# Patient Record
(demographics unavailable — no encounter records)

---

## 2024-10-11 NOTE — HISTORY OF PRESENT ILLNESS
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 She is unable to finish her tasks on time and has to put extra effort into her work way beyond what her friends will do. She is here to be evaluated for ADHD. Patient often has trouble wrapping up details of a project, getting things in order, remembering appointments/obligations, keeping attention during boring/repetitive work, concentrating on what people say to her, and finding things at home/work. Patient often avoids starting tasks, fidgets/squirms, feels overly compelled to do things, makes careless mistakes, and gets distracted by noise/activity around them. Patient often leaves her seat in situations she should be seated, has difficulty unwinding during down time, and finds herself talking too much in social situations. Patient has to read and reread written material to maintain comprehension.  Diagnostic Testing : MRI Brain 10.16.23 : Normal MRI Brain EEg 11.15.23 : Normal EZRA 11.22.23 : Score below 0, results consistent with the findings in patient with ADHD  Today : Today I had the pleasure of seeing Ms. Figueroa in  follow up via tele-health using the solo bibi.  The patients previous history and physical findings have been reviewed.  Patient remains under our care for ADHD, a chronic condition for which she is receiving active treatment for. 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 Today patient reports significant improvement in her ability to focus and concentrate while limiting distractions on her new regimen and denies adverse side effects. She is happy with her current management and wishes to continue as is. Today she will complete updated urine drug screening as part of our offices controlled substance agreement.

## 2024-10-11 NOTE — ASSESSMENT
[FreeTextEntry1] : 30 year old female with adult ADHD. She will continue her regimen of Adderall ER 10mg once daily and an additional Adderall ER 5mg once daily PRN. She  follow up in 1 month via TTM and today will complete updated UDS testing.  Patient is aware that they may call/ contact the office at any time if they have any additional questions or concerns regarding their management. All potential risks, benefits, side effects and interactions of any medications prescribed have been discussed in detail with the patient.   Supervising Physician : Honorio Catalan MD

## 2024-10-11 NOTE — PHYSICAL EXAM
[FreeTextEntry1] : PHYSICAL EXAMINATION: Head: Normocephalic, atraumatic. Negative TA tenderness/prominence.   Neck: Supple with full range of motion; nontender with negative bilateral Spurling's signs.   Spine: Full range of motion; nontender. Negative straight leg raise maneuvers.   Extremities: Non-tender. Atraumatic. Negative Tinel's signs.   NEUROLOGICAL EXAMINATION:   Mental Status: Patient is a good informant with intact orientation, attention, concentration, recent and remote memory. Language evaluation reveals no evidence of aphasia. Fund of knowledge is normal.   Cranial Nerves Cranial Nerves:   II, III, IV, VI: Pupils are equal, round, and reactive to light and accommodation. No evidence of afferent pupillary defect. Visual fields are full to confrontation. Eye movements are full without evidence of nystagmus or internuclear ophthalmoplegia. Funduscopic examination reveals sharp disc margins.   V: Normal jaw movements. Normal facial sensation.   VII: Normal facial motor testing.   VIII: Grossly normal hearing bilaterally.   IX, X: Palate moves symmetrically. No dysarthria.   XI: Normal shoulder shrug and sternocleidomastoid power.   XII: Tongue appears normal and protrudes in the midline.   Motor: Normal bulk, tone, and power throughout.   Muscle Stretch Reflexes (right/left): 2+ symmetrical.   Plantar Responses: Flexor bilaterally.   Coordination: Normal finger to nose and heel to shin testing, no truncal ataxia and no tremor.   Sensation: Normal primary sensation. Normal double simultaneous stimulation.   Gait and Station: Normal base, stride, and turning. Normal toe and heel walking. Normal tandem. Negative Romberg. [General Appearance - Alert] : alert [General Appearance - In No Acute Distress] : in no acute distress [Oriented To Time, Place, And Person] : oriented to person, place, and time [Impaired Insight] : insight and judgment were intact [Affect] : the affect was normal [Person] : oriented to person [Place] : oriented to place [Time] : oriented to time [Concentration Intact] : normal concentrating ability [Visual Intact] : visual attention was ~T not ~L decreased [Naming Objects] : no difficulty naming common objects [Repeating Phrases] : no difficulty repeating a phrase [Writing A Sentence] : no difficulty writing a sentence [Fluency] : fluency intact [Comprehension] : comprehension intact [Reading] : reading intact [Past History] : adequate knowledge of personal past history [Cranial Nerves Optic (II)] : visual acuity intact bilaterally,  visual fields full to confrontation, pupils equal round and reactive to light [Cranial Nerves Oculomotor (III)] : extraocular motion intact [Cranial Nerves Trigeminal (V)] : facial sensation intact symmetrically [Cranial Nerves Facial (VII)] : face symmetrical [Cranial Nerves Vestibulocochlear (VIII)] : hearing was intact bilaterally [Cranial Nerves Glossopharyngeal (IX)] : tongue and palate midline [Cranial Nerves Accessory (XI - Cranial And Spinal)] : head turning and shoulder shrug symmetric [Cranial Nerves Hypoglossal (XII)] : there was no tongue deviation with protrusion [Motor Strength] : muscle strength was normal in all four extremities [No Muscle Atrophy] : normal bulk in all four extremities [Motor Strength Upper Extremities Bilaterally] : strength was normal in both upper extremities [Motor Strength Lower Extremities Bilaterally] : strength was normal in both lower extremities [Sensation Tactile Decrease] : light touch was intact [Balance] : balance was intact [Past-pointing] : there was no past-pointing [Tremor] : no tremor present [2+] : Ankle jerk left 2+ [Plantar Reflex Right Only] : normal on the right [Plantar Reflex Left Only] : normal on the left [FreeTextEntry6] : - JINA B/l  [Extraocular Movements] : extraocular movements were intact [PERRL With Normal Accommodation] : pupils were equal in size, round, reactive to light, with normal accommodation [No Spinal Tenderness] : no spinal tenderness [Abnormal Walk] : normal gait [Involuntary Movements] : no involuntary movements were seen [Motor Tone] : muscle strength and tone were normal

## 2024-10-16 NOTE — HISTORY OF PRESENT ILLNESS
[Y] : Positive pregnancy history [Normal Amount/Duration] :  normal amount and duration [Regular Cycle Intervals] : periods have been regular [No] : Patient does not have concerns regarding sex [Currently Active] : currently active [LMPDate] : 10/02/24 [MensesFreq] : 28 [MensesLength] : 3 [MensesAmount] : light [PGxTotal] : 1 [facility] [FreeTextEntry1] : 10/01/24

## 2024-10-16 NOTE — PHYSICAL EXAM
[Chaperone Present] : A chaperone was present in the examining room during all aspects of the physical examination [[id number]] : A chaperone was present during the pelvic exam. [Appropriately responsive] : appropriately responsive [Alert] : alert [No Acute Distress] : no acute distress [Soft] : soft [Non-tender] : non-tender [Non-distended] : non-distended [Oriented x3] : oriented x3 [Examination Of The Breasts] : a normal appearance [No Discharge] : no discharge [No Masses] : no breast masses were palpable [Labia Majora] : normal [Labia Minora] : normal [Normal] : normal [Uterine Adnexae] : normal [FreeTextEntry2] : Qian

## 2024-10-16 NOTE — HISTORY OF PRESENT ILLNESS
[Y] : Positive pregnancy history [Normal Amount/Duration] :  normal amount and duration [Regular Cycle Intervals] : periods have been regular [No] : Patient does not have concerns regarding sex [Currently Active] : currently active [LMPDate] : 10/02/24 [MensesFreq] : 28 [MensesLength] : 3 [MensesAmount] : light [PGxTotal] : 1 [facility] [FreeTextEntry1] : 10/01/24

## 2024-10-16 NOTE — PHYSICAL EXAM
[Chaperone Present] : A chaperone was present in the examining room during all aspects of the physical examination [[id number]] : A chaperone was present during the pelvic exam. [Appropriately responsive] : appropriately responsive [Alert] : alert [No Acute Distress] : no acute distress [Soft] : soft [Non-tender] : non-tender [Non-distended] : non-distended [Oriented x3] : oriented x3 [Examination Of The Breasts] : a normal appearance [No Discharge] : no discharge [No Masses] : no breast masses were palpable [Labia Majora] : normal [Labia Minora] : normal [Normal] : normal [Uterine Adnexae] : normal [FreeTextEntry2] : Qian

## 2024-10-16 NOTE — PROCEDURE
[Cervical Pap Smear] : cervical Pap smear [Liquid Base] : liquid base [GC & Chlamydia via Pap] : GC & Chlamydia via Pap [Tolerated Well] : the patient tolerated the procedure well [No Complications] : there were no complications [Transvaginal Ultrasound] : transvaginal ultrasound [L: ___ cm] : L: [unfilled] cm [W: ___cm] : W: [unfilled] cm [H: ___ cm] : H: [unfilled] cm [FreeTextEntry9] : Irregular menses [FreeTextEntry5] : 52.74cc vol 8.48mm [FreeTextEntry7] : 13.97cc vol [FreeTextEntry8] : 7.14cc vol

## 2025-01-28 NOTE — IMAGING
[de-identified] : Pleasant young woman walks into my office in no distress.  She walks in with no antalgia or limp.  Physical examination: Pleated with female medical assistant present Left hip thigh and knee: Mild tenderness palpation of the middle portion of her iliotibial band.  No focal tenderness over the trochanteric bursa or over inguinal crease.  No lymph nodes inguinal crease.  No tenderness along the lateral joint line of her knee.  No significant tenderness over the lateral epicondyle.  Mildly abnormal provocative testing iliotibial band.  Flexion abduction external rotation elicits mild discomfort over the lateral aspects of her thigh.  She does not have pain with forced internal or external rotation of joints at full extension 9 degrees of flexion and neutral abduction.  Radiographs: Left hip (AP pelvis, lateral): No bony abnormalities.  No evidence of fracture.  Joint spaces well-preserved.  Bellybutton ring present on plain radiograph.
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
## 2025-01-28 NOTE — ASSESSMENT
[FreeTextEntry1] : Young woman contused iliotibial band 5 years ago with some residual pain over the lateral aspect of her thigh.  Suspect she probably had some injury to the iliotibial band.  There is not a specific surgical management for this problem.  Instead recommend physical therapy which should focus on teaching a self-directed stretching and strengthening and judicious use of nonsteroidal anti-inflammatory medication.  Findings and recommendations were reviewed with the patient.  Questions answered to her satisfaction.  Prescription for Naprosyn provided.  Prescription for PT provided.  Follow-up 3 months.  All questions answered to her satisfaction.  Cc copy note to her primary medical doctor.

## 2025-02-24 NOTE — REASON FOR VISIT
Maryann Follow-Up: _____] : a [unfilled] follow-up visit

## 2025-02-24 NOTE — HISTORY OF PRESENT ILLNESS
[FreeTextEntry1] : Today : Today I had the pleasure of seeing Ms. FIGUEROA in our office for follow up.  Their past medical history and imaging have been reviewed.   Ms. Figueroa is a pleasant 30 year old female who remains under our care for ADHD, a chronic condition for which she is receiving active treatment for. 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 Today patient reports significant improvement in her ability to focus and concentrate while limiting distractions on her new regimen and denies adverse side effects. She is happy with her current management and wishes to continue as is. Today she will complete updated UDs testing.

## 2025-02-24 NOTE — ASSESSMENT
[FreeTextEntry1] : 30 year old female with ADHD stable on her regimen of Adderall Er 10mg once daily and ER 5mg once daily PRN. UDs completed today.  Patient will RTO for f/u in office in 1 month. [place]  checked no inconsistencies.   Supervising Physician : López Benedict DO

## 2025-03-10 NOTE — REVIEW OF SYSTEMS
[Arthralgia] : arthralgia [Joint Pain] : joint pain [Joint Stiffness] : joint stiffness [Negative] : Constitutional [de-identified] : L lower extremity

## 2025-03-10 NOTE — HISTORY OF PRESENT ILLNESS
[de-identified] : Ms. Figueroa is a 30 year old female who presents to the office accompanied by her  for evaluation of lower back pain that returned about a week ago.  She had been previously seen for this lower back issue however was doing extremely well.  She is unsure exactly what happened stating that the pain came out of nowhere and has been excruciating.  She states is predominantly localized to the left side of her lower back and radiates down the leg.  Will sometimes transfer to the right side depending upon what she is doing.  She can hardly sit or stand and states that walking is almost impossible.  She has been using a cane but states it is not helping.  She also been using anti-inflammatory medication without any improvement and did set up for physical therapy session however it is not until next week.

## 2025-03-10 NOTE — IMAGING
[de-identified] : Lumbar spine: 4 out of 5 strength, 2+ reflexes, limited range of motion due to pain, positive tenderness bilateral lumbar paraspinal muscles, positive straight leg raise on the left at 20 degrees.

## 2025-03-10 NOTE — ASSESSMENT
[FreeTextEntry1] : 30-year-old female with lumbar radiculopathy most likely secondary to herniated or protruding disc.  I have prescribed her with a Medrol Dosepak as well as methocarbamol 750 mg 1-2 before bed.  I have also requested authorization for an MRI of her lumbar spine for further evaluation and she will start physical therapy hopefully next week.  She will follow-up in 4 weeks for reassessment is aware if there is any issues she can contact the office and they verbalized understanding and agreement.

## 2025-03-19 NOTE — DISCUSSION/SUMMARY
[de-identified] : 30-year-old female presenting with left lower extremity lumbar radiculopathy.  I am recommending an MRI of her lumbar spine.  We are prescription for meloxicam.  Follow-up with pain management I will see her back in a few months after her appointment with pain management after her MRI of the lumbar spine is complete.

## 2025-03-19 NOTE — IMAGING
[de-identified] : TTP midline spine and paraspinal musculature  Strength                                          Hip flexor   Right: 5/5; Left: 5/5                              Knee extensor     Right: 5/5; Left: 5/5                      Ankle dorsiflexion   Right: 5/5; Left: 5/5                   EHL           Right: 5/5; Left: 5/5                                 Ankle plantarflexion       Right: 5/5; Left: 5/5  Sensation L1   Right: 2/2; Left: 2/2 L2   Right: 2/2; Left: 2/2 L3   Right: 2/2; Left: 2/2 L4   Right: 2/2; Left: 2/2 L5   Right: 2/2; Left: 2/2 S1   Right: 2/2; Left: 2/2  Reflexes Patella   Right: 2+; Left 2+ Achilles   Right: 2+; Left 2+ Clonus  Right: absent; L: absent  Positive straight leg raise test on the left

## 2025-03-19 NOTE — HISTORY OF PRESENT ILLNESS
[de-identified] : 30-year-old female presents with low back pain radiating down her left leg going down to the left foot.  The pain is excruciating if it is affecting her quality of life and her ability to do her ADLs.  The pain is so bad she went to the emergency room she had a CT scan of her lumbar spine that patient was discharged.  She has done a Medrol Dosepak that does not help her.  She is in physical therapy is not helping her.  She takes ibuprofen that is not helping her.

## 2025-06-03 NOTE — PROCEDURE
[IUD Placement] : intrauterine device (IUD) placement [Time out performed] : Pre-procedure time out performed.  Patient's name, date of birth and procedure confirmed. [Consent Obtained] : Consent obtained [Risks] : risks [Benefits] : benefits [Alternatives] : alternatives [Patient] : patient [Infection] : infection [Bleeding] : bleeding [Pain] : pain [Expulsion] : expulsion [Failure] : failure [Uterine Perforation] : uterine perforation [Neg Pregnancy Test] : negative pregnancy test [Betadine] : Betadine [Tenaculum] : Tenaculum [ParaGard IUD] : ParaGard IUD [Tolerated Well] : Patient tolerated the procedure well [No Complications] : No complications [None] : None [de-identified] : [id number] [de-identified] : 04/27 [de-identified] : JUNE 2035